# Patient Record
Sex: MALE | URBAN - METROPOLITAN AREA
[De-identification: names, ages, dates, MRNs, and addresses within clinical notes are randomized per-mention and may not be internally consistent; named-entity substitution may affect disease eponyms.]

---

## 2021-05-25 ENCOUNTER — RECORDS - HEALTHEAST (OUTPATIENT)
Dept: ADMINISTRATIVE | Facility: CLINIC | Age: 41
End: 2021-05-25

## 2023-07-27 PROBLEM — K21.9 GERD (GASTROESOPHAGEAL REFLUX DISEASE): Status: ACTIVE | Noted: 2023-07-27

## 2023-07-27 PROBLEM — F11.21 HISTORY OF NARCOTIC ADDICTION (MULTI): Status: ACTIVE | Noted: 2023-07-27

## 2023-07-27 PROBLEM — H66.90 EAR INFECTION: Status: ACTIVE | Noted: 2023-07-27

## 2023-07-27 PROBLEM — M25.562 BILATERAL KNEE PAIN: Status: ACTIVE | Noted: 2023-07-27

## 2023-07-27 PROBLEM — M77.00 MEDIAL EPICONDYLITIS OF ELBOW: Status: ACTIVE | Noted: 2023-07-27

## 2023-07-27 PROBLEM — M25.561 BILATERAL KNEE PAIN: Status: ACTIVE | Noted: 2023-07-27

## 2023-07-31 ENCOUNTER — OFFICE VISIT (OUTPATIENT)
Dept: PRIMARY CARE | Facility: CLINIC | Age: 43
End: 2023-07-31
Payer: COMMERCIAL

## 2023-07-31 VITALS
HEART RATE: 72 BPM | OXYGEN SATURATION: 97 % | SYSTOLIC BLOOD PRESSURE: 132 MMHG | DIASTOLIC BLOOD PRESSURE: 83 MMHG | BODY MASS INDEX: 23.51 KG/M2 | WEIGHT: 164.2 LBS | HEIGHT: 70 IN | TEMPERATURE: 97.8 F

## 2023-07-31 DIAGNOSIS — M25.511 PAIN AND SWELLING OF RIGHT SHOULDER: ICD-10-CM

## 2023-07-31 DIAGNOSIS — F17.200 TOBACCO USE DISORDER: ICD-10-CM

## 2023-07-31 DIAGNOSIS — Z00.00 HEALTHCARE MAINTENANCE: Primary | ICD-10-CM

## 2023-07-31 DIAGNOSIS — M25.411 PAIN AND SWELLING OF RIGHT SHOULDER: ICD-10-CM

## 2023-07-31 PROBLEM — M22.40 CHONDROMALACIA OF PATELLA: Status: ACTIVE | Noted: 2023-05-02

## 2023-07-31 PROCEDURE — 99213 OFFICE O/P EST LOW 20 MIN: CPT | Performed by: STUDENT IN AN ORGANIZED HEALTH CARE EDUCATION/TRAINING PROGRAM

## 2023-07-31 PROCEDURE — 99406 BEHAV CHNG SMOKING 3-10 MIN: CPT | Performed by: STUDENT IN AN ORGANIZED HEALTH CARE EDUCATION/TRAINING PROGRAM

## 2023-07-31 ASSESSMENT — ENCOUNTER SYMPTOMS
LIGHT-HEADEDNESS: 0
NUMBNESS: 0
CHILLS: 0
SHORTNESS OF BREATH: 1
FEVER: 0
DIAPHORESIS: 0
VOMITING: 0
DYSURIA: 0
WEAKNESS: 0
NAUSEA: 0
DIZZINESS: 0

## 2023-07-31 ASSESSMENT — PATIENT HEALTH QUESTIONNAIRE - PHQ9
1. LITTLE INTEREST OR PLEASURE IN DOING THINGS: NOT AT ALL
2. FEELING DOWN, DEPRESSED OR HOPELESS: NOT AT ALL
SUM OF ALL RESPONSES TO PHQ9 QUESTIONS 1 AND 2: 0

## 2023-07-31 NOTE — PROGRESS NOTES
"Subjective   Patient ID: Hoang Nelson Jr. is a 42 y.o. male who presents for bump on the back.  He is here for a bump on his back. A few weeks ago he was at a race with his son. He was pulling a generator and then felt a pop in his shoulder blader and was bulging out his shirt. Swelling has improved but still there. Some pain initially and now intermittently painful. No numbness/tingling in shoulders, no weakness in shoulders. He had it massaged at the race he was at but didn't help. Took a few tylenol over last few weeks.    Had a fever 2 weeks ago a/w chills and sweats and resolved, felt nauseous. Those have resolved.    Still smokes, cut back on this to 1/2 ppd. It's a habit for him and hard for him to quit. Tried chantix and had bad dreams, tried cold turkey.         Review of Systems   Constitutional:  Negative for chills, diaphoresis and fever (100.3F at home 2 weeks ago, resolved).   HENT:  Negative for hearing loss.    Eyes:  Negative for visual disturbance.   Respiratory:  Positive for shortness of breath (present and unchanged for a few years, worse with strenuous activity. No SOB or CP with 2 flights of stairs.).    Cardiovascular:  Negative for chest pain.   Gastrointestinal:  Negative for nausea and vomiting.   Genitourinary:  Negative for dysuria.   Neurological:  Negative for dizziness, weakness, light-headedness and numbness.       /83   Pulse 72   Temp 36.6 °C (97.8 °F)   Ht 1.778 m (5' 10\")   Wt 74.5 kg (164 lb 3.2 oz)   SpO2 97%   BMI 23.56 kg/m²     Objective   Physical Exam  Vitals reviewed.   Constitutional:       General: He is not in acute distress.     Appearance: Normal appearance.   HENT:      Head: Normocephalic.   Cardiovascular:      Rate and Rhythm: Normal rate and regular rhythm.   Pulmonary:      Effort: Pulmonary effort is normal. No respiratory distress.      Breath sounds: Normal breath sounds.   Abdominal:      General: There is no distension.   Musculoskeletal:        "  General: No deformity.      Comments: Minimal pain with Apley scratch test, normal empty can test and normal strength to bilateral upper extremities.  Sensation grossly intact bilateral upper extremities.  3 cm round in diameter and raised approximately 1 to 2 cm over the right posterior shoulder.  Mild to moderate pain with palpation.   Skin:     Coloration: Skin is not jaundiced.   Neurological:      Mental Status: He is alert.         Assessment/Plan   Problem List Items Addressed This Visit    None  Visit Diagnoses       Healthcare maintenance    -  Primary    Relevant Orders    Follow Up In [facility] - PCP - Health Maintenance    Pain and swelling of right shoulder        Relevant Orders    Referral to Orthopaedic Surgery    US MSK upper extremity joints tendons muscles    Tobacco use disorder [F17.200]            Pain and swelling of right shoulder  - Ultrasound ordered to evaluate the swelling in more detail as it seems likely to be a soft tissue strain with associated swelling.  - Referred to orthopedics of the shoulder for possible drainage of this/further evaluation.    Tobacco use disorder  -I spent more than 3 minutes (but less than 10 minutes) counseling patient about need for smoking/tobacco cessation and how I can support efforts when patient is ready to quit.  Discussed nicotine replacement therapy, Varenicline, Bupropion, hypnosis, support groups, and acupunture as potential options.  Patient currently has no signs or symptoms of tobacco related disease.   -He is not interested in quitting.  Limited success with Chantix, nicotine replacement therapy.  He is considering hypnosis potentially.    Healthcare maintenance  - Advised to follow-up with Dr. Lowe in 1 to 3 months.

## 2023-09-27 ENCOUNTER — OFFICE VISIT (OUTPATIENT)
Dept: PRIMARY CARE | Facility: CLINIC | Age: 43
End: 2023-09-27
Payer: COMMERCIAL

## 2023-09-27 VITALS
DIASTOLIC BLOOD PRESSURE: 70 MMHG | RESPIRATION RATE: 16 BRPM | OXYGEN SATURATION: 97 % | HEART RATE: 76 BPM | BODY MASS INDEX: 23.53 KG/M2 | SYSTOLIC BLOOD PRESSURE: 128 MMHG | WEIGHT: 164 LBS

## 2023-09-27 DIAGNOSIS — Z00.00 HEALTHCARE MAINTENANCE: ICD-10-CM

## 2023-09-27 PROCEDURE — 99396 PREV VISIT EST AGE 40-64: CPT | Performed by: INTERNAL MEDICINE

## 2023-09-27 ASSESSMENT — ENCOUNTER SYMPTOMS: SLEEP DISTURBANCE: 1

## 2023-09-27 NOTE — PROGRESS NOTES
"Patient here for a physical - declined chaperone     Subjective   Patient ID: Hoang Nelson Jr. is a 43 y.o. male who presents for Annual Exam.    The patient reports a small painful vesicle on the occipital region of the scalp.  He denies any pruritus and suspects it may be an ingrown hair.    The patient mentions a skin erythema on the face that may be due to a \"flash burn\".      The patient reports an episode of medial epicondylitis in the right elbow that subsided after an ER visit in 4/2023.    The patient mentions ongoing knee pain despite multiple corticosteroid injections that affect his sleep quality.  He states that he has been seen by multiple Specialists.    The patient denies any hearing impairment or vision changes and wears prescription lenses.      The patient's mother has Multiple Sclerosis.  He denies any known family history of colon or prostate cancer.      Review of Systems   HENT:  Negative for hearing loss.    Eyes:  Negative for visual disturbance.   Musculoskeletal:         Positive for knee pain.   Skin:         Positive for painful vesicle on the scalp and skin erythema on the face.   Psychiatric/Behavioral:  Positive for sleep disturbance.      Objective   Physical Exam  Constitutional:       Appearance: Normal appearance.   Neck:      Vascular: No carotid bruit.   Cardiovascular:      Rate and Rhythm: Normal rate and regular rhythm.      Heart sounds: Normal heart sounds.   Pulmonary:      Effort: Pulmonary effort is normal.      Breath sounds: Normal breath sounds.   Abdominal:      General: Bowel sounds are normal.      Palpations: Abdomen is soft.      Tenderness: There is no abdominal tenderness.   Skin:     General: Skin is warm and dry.   Neurological:      General: No focal deficit present.      Mental Status: He is alert and oriented to person, place, and time. Mental status is at baseline.   Psychiatric:         Mood and Affect: Mood normal.         Behavior: Behavior normal. "       Assessment/Plan   Problem List Items Addressed This Visit    None  Visit Diagnoses         Codes    Healthcare maintenance     Z00.00    Relevant Orders    Lipid panel    Comprehensive metabolic panel    CBC and Auto Differential            CPE Performed  -  Discussed healthy diet and regular exercise.    -  Physical exam overall unremarkable. Immunizations reviewed and updated accordingly. Healthy lifestyle choices discussed (tobacco avoidance, appropriate alcohol use, avoidance of illicit substances).   -  Patient is wearing seatbelt.   -  Screening lab work ordered as indicated.    -  Age appropriate screening tests reviewed with patient.        IMPRESSIONS/PLAN:     Painful Vesicle/Skin Erythema  - Ordered referral to  from Dermatology.    Knee Pain  - Offered to refer to Orthopedic Surgery, but patient would like to hold off for now.     BP is 128/70 today in office.     Health Maintenance   - Routine labs ordered including CBC, CMP, and a lipid panel to be completed in the fasting state.       Follow up with me as scheduled. Phone sooner if needed.        Scribe Attestation  By signing my name below, ICarol, Scribe   attest that this documentation has been prepared under the direction and in the presence of Esvin Lowe DO.